# Patient Record
Sex: FEMALE | Race: WHITE | NOT HISPANIC OR LATINO | Employment: OTHER | URBAN - METROPOLITAN AREA
[De-identification: names, ages, dates, MRNs, and addresses within clinical notes are randomized per-mention and may not be internally consistent; named-entity substitution may affect disease eponyms.]

---

## 2024-07-31 ENCOUNTER — APPOINTMENT (OUTPATIENT)
Dept: OBSTETRICS AND GYNECOLOGY | Facility: CLINIC | Age: 44
End: 2024-07-31
Payer: COMMERCIAL

## 2024-07-31 VITALS
SYSTOLIC BLOOD PRESSURE: 108 MMHG | WEIGHT: 119 LBS | HEIGHT: 63 IN | DIASTOLIC BLOOD PRESSURE: 50 MMHG | BODY MASS INDEX: 21.09 KG/M2

## 2024-07-31 DIAGNOSIS — Z12.31 BREAST CANCER SCREENING BY MAMMOGRAM: ICD-10-CM

## 2024-07-31 DIAGNOSIS — Z01.419 WELL WOMAN EXAM WITH ROUTINE GYNECOLOGICAL EXAM: Primary | ICD-10-CM

## 2024-07-31 DIAGNOSIS — R45.4 IRRITABILITY: ICD-10-CM

## 2024-07-31 DIAGNOSIS — Z12.4 CERVICAL CANCER SCREENING: ICD-10-CM

## 2024-07-31 PROCEDURE — 99386 PREV VISIT NEW AGE 40-64: CPT | Performed by: OBSTETRICS & GYNECOLOGY

## 2024-07-31 PROCEDURE — 3008F BODY MASS INDEX DOCD: CPT | Performed by: OBSTETRICS & GYNECOLOGY

## 2024-07-31 PROCEDURE — 1036F TOBACCO NON-USER: CPT | Performed by: OBSTETRICS & GYNECOLOGY

## 2024-07-31 RX ORDER — THYROID, PORCINE 30 MG/1
1 TABLET ORAL
COMMUNITY
Start: 2024-03-01

## 2024-07-31 ASSESSMENT — PAIN SCALES - GENERAL: PAINLEVEL: 0-NO PAIN

## 2024-07-31 NOTE — PROGRESS NOTES
"Carly Colby is a 44 y.o. female who is here for a routine exam. PCP = No Assigned PCP Generic Provider, MD    HPI: Having some irregular periods sometimes every two weeks- on the day it starts it is so heavy for one year. She feels very irritable.  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  Her hubs is also super stress and with his job.  No night sweats or hots flashes.    Past med hx and past surg hx reviewed and notable for: thyroid- hashimoto,   GynHx: HPV in the past.  Periods are irregular for one year, lasting 2 days and it gets really heavy that one day.  Sexual concerns: none  Current contraception: None  Seatbelt: always  Social kids 9 and 12-   STIs: none  Exercise: walking daily    Social History     Socioeconomic History    Marital status:      Spouse name: Not on file    Number of children: Not on file    Years of education: Not on file    Highest education level: Not on file   Occupational History    Not on file   Tobacco Use    Smoking status: Never    Smokeless tobacco: Never   Vaping Use    Vaping status: Never Used   Substance and Sexual Activity    Alcohol use: Yes     Comment: occ    Drug use: Never    Sexual activity: Yes     Partners: Male     Birth control/protection: Male Sterilization   Other Topics Concern    Not on file   Social History Narrative    Not on file     Social Determinants of Health     Financial Resource Strain: Not on file   Food Insecurity: Not on file   Transportation Needs: Not on file   Physical Activity: Not on file   Stress: Not on file   Social Connections: Not on file   Intimate Partner Violence: Not on file   Housing Stability: Not on file       Objective   /50   Ht 1.6 m (5' 3\")   Wt 54 kg (119 lb)   LMP 07/17/2024   BMI 21.08 kg/m²    General:   Alert and oriented, in no acute distress   Neck: Supple. No visible thyromegaly.    Breast/Axilla: Normal to " palpation bilaterally without masses, skin changes, or nipple discharge.    Abdomen: Soft, non-tender, without masses or organomegaly   Vulva: Normal architecture without erythema, masses, or lesions.    Vagina: Normal mucosa without lesions, masses, or atrophy. No abnormal vaginal discharge.    Cervix: Normal without masses, lesions, or signs of cervicitis.    Uterus: Normal mobile, non-enlarged uterus    Adnexa: Normal without masses or lesions   Pelvic Floor No POP noted. No high tone pelvic floor    Psych Normal affect. Normal mood.        Annual  -Pap and HPV today  -Mammogram: ordered  - Irritability will refer to psychology  -Irregular periods- will check her TSH  -PCP referral  -Vas for contracption    Orders Placed This Encounter   Procedures    BI mammo bilateral screening tomosynthesis     Standing Status:   Future     Standing Expiration Date:   9/30/2025     Order Specific Question:   Perform a breast ultrasound if clinically indicated by Radiologist?     Answer:   Yes     Order Specific Question:   Is the patient pregnant?     Answer:   No     Order Specific Question:   Previous Mamm performed at [facility] location?     Answer:   Yes     Order Specific Question:   Reason for exam:     Answer:   Screening     Order Specific Question:   Radiologist to Determine Optimal Study     Answer:   Yes     Order Specific Question:   Release result to [employer]     Answer:   Immediate [1]     Order Specific Question:   Is this exam part of a Research Study? If Yes, link this order to the research study     Answer:   No    Referral to Ob-Gyn Behavioral Health     Standing Status:   Future     Standing Expiration Date:   7/31/2025     Referral Priority:   Routine     Referral Type:   Consultation     Requested Specialty:   Psychology     Number of Visits Requested:   1       Problem List Items Addressed This Visit          Ob-Gyn Problems    Breast cancer screening by mammogram    Relevant Orders    BI mammo bilateral  screening tomosynthesis    Cervical cancer screening    Relevant Orders    THINPREP PAP TEST    Well woman exam with routine gynecological exam - Primary       Other    Irritability    Relevant Orders    Referral to Ob-Gyn Behavioral Health        Thank you for coming to your annual exam.

## 2024-08-17 ENCOUNTER — HOSPITAL ENCOUNTER (OUTPATIENT)
Dept: RADIOLOGY | Facility: CLINIC | Age: 44
End: 2024-08-17
Payer: COMMERCIAL

## 2024-08-19 LAB
CYTOLOGY CMNT CVX/VAG CYTO-IMP: NORMAL
HPV HR 12 DNA GENITAL QL NAA+PROBE: NEGATIVE
HPV HR GENOTYPES PNL CVX NAA+PROBE: NEGATIVE
HPV16 DNA SPEC QL NAA+PROBE: NEGATIVE
HPV18 DNA SPEC QL NAA+PROBE: NEGATIVE
LAB AP HPV GENOTYPE QUESTION: YES
LAB AP HPV HR: NORMAL
LABORATORY COMMENT REPORT: NORMAL
PATH REPORT.TOTAL CANCER: NORMAL

## 2024-08-20 ENCOUNTER — TELEPHONE (OUTPATIENT)
Dept: OBSTETRICS AND GYNECOLOGY | Facility: CLINIC | Age: 44
End: 2024-08-20
Payer: COMMERCIAL

## 2024-08-20 NOTE — TELEPHONE ENCOUNTER
----- Message from Corinne Bazella sent at 8/20/2024  2:50 PM EDT -----  She does not have mychart to see this pap- how do they get results?    8/20/24 [id number] [facility].